# Patient Record
Sex: FEMALE | Race: WHITE
[De-identification: names, ages, dates, MRNs, and addresses within clinical notes are randomized per-mention and may not be internally consistent; named-entity substitution may affect disease eponyms.]

---

## 2019-10-08 ENCOUNTER — HOSPITAL ENCOUNTER (EMERGENCY)
Dept: HOSPITAL 7 - FB.ED | Age: 39
Discharge: HOME | End: 2019-10-08
Payer: SELF-PAY

## 2019-10-08 DIAGNOSIS — H83.09: Primary | ICD-10-CM

## 2019-10-08 DIAGNOSIS — E86.0: ICD-10-CM

## 2019-10-08 DIAGNOSIS — Z91.040: ICD-10-CM

## 2019-10-08 DIAGNOSIS — G43.909: ICD-10-CM

## 2019-10-08 PROCEDURE — 96375 TX/PRO/DX INJ NEW DRUG ADDON: CPT

## 2019-10-08 PROCEDURE — 96374 THER/PROPH/DIAG INJ IV PUSH: CPT

## 2019-10-08 PROCEDURE — 80053 COMPREHEN METABOLIC PANEL: CPT

## 2019-10-08 PROCEDURE — 86140 C-REACTIVE PROTEIN: CPT

## 2019-10-08 PROCEDURE — 85025 COMPLETE CBC W/AUTO DIFF WBC: CPT

## 2019-10-08 PROCEDURE — 36415 COLL VENOUS BLD VENIPUNCTURE: CPT

## 2019-10-08 PROCEDURE — 81001 URINALYSIS AUTO W/SCOPE: CPT

## 2019-10-08 PROCEDURE — 99284 EMERGENCY DEPT VISIT MOD MDM: CPT

## 2019-10-08 PROCEDURE — 96361 HYDRATE IV INFUSION ADD-ON: CPT

## 2019-10-08 NOTE — EDM.PDOC
ED HPI GENERAL MEDICAL PROBLEM





- General


Time Seen by Provider: 10/08/19 18:45


Source of Information: Reports: Patient


History Limitations: Reports: No Limitations





- History of Present Illness


INITIAL COMMENTS - FREE TEXT/NARRATIVE: 





c/o dizzy





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 

son





today is son's bday, pt making a cake at 2:30p and became dizzy, room began to 

spin, felt better when she laid down but only with her eyes closed





has a R sided HA which is typical for her migraines, several months since her 

last migraine, took Advil Migraine x 2 which did not help





did not take meds for her dizziness which she has had several times in the past





has numbness of her face and right hand





her friend is a nurse and come over to help, after 2h friend brought her to the 

hospital once they were able to get someone to watch her son





says she is feeling double





has some N, no V





no f/c/d





possible L sided weakness per nursing, not reproducible however


  ** R sided headache


Pain Score (Numeric/FACES): 9





- Related Data


 Allergies











Allergy/AdvReac Type Severity Reaction Status Date / Time


 


latex Allergy  Rash Verified 10/08/19 18:52











Home Meds: 


 Home Meds





Budesonide/Formoterol [Symbicort 160-4.5 MCG] 2 puff BID 10/08/19 [History]


Levothyroxine 200 mcg PO ACBREAKFAST 10/08/19 [History]


Meclizine HCl 25 mg PO TID PRN #21 tablet 10/08/19 [Rx]


traZODone HCl [Trazodone HCl] 50 mg PO BEDTIME 10/08/19 [History]











ED ROS GENERAL





- Review of Systems


Review Of Systems: See Below


Constitutional: Reports: Weakness


HEENT: Reports: No Symptoms


Respiratory: Reports: No Symptoms


Cardiovascular: Reports: No Symptoms


Endocrine: Reports: No Symptoms


GI/Abdominal: Reports: Nausea


: Reports: No Symptoms


Musculoskeletal: Reports: No Symptoms


Skin: Reports: No Symptoms


Neurological: Reports: Dizziness, Headache, Numbness


Psychiatric: Reports: Anxiety


Hematologic/Lymphatic: Reports: No Symptoms


Immunologic: Reports: No Symptoms





ED EXAM, DIZZINESS





- Physical Exam


Exam: See Below


Exam Limited By: No Limitations


General Appearance: Alert, WD/WN, Other (pleasant, normal speech, holding 

emesis bag, cooperative, inc'd BMI)


Eye Exam: Bilateral Eye: EOMI, PERRL


Ears: Normal External Exam, Normal Canal, Hearing Grossly Normal


Nose: Normal Inspection, Normal Mucosa, No Blood


Throat/Mouth: Normal Inspection, Normal Lips, Normal Teeth, Normal Gums, Normal 

Oropharynx, Normal Voice, No Airway Compromise


Head Exam: Atraumatic, Normocephalic


Neck: Normal Inspection, Supple, Non-Tender, Full Range of Motion.  No: 

Lymphadenopathy (R), Lymphadenopathy (L)


Respiratory/Chest: No Respiratory Distress, Lungs Clear, Normal Breath Sounds, 

No Accessory Muscle Use, Chest Non-Tender


Cardiovascular: Regular Rate, Rhythm, No Edema, No Gallop, No JVD, No Murmur, 

No Rub


GI/Abdominal: Soft, Non-Tender, No Distention


Neurological: Alert, Oriented x 3, Other (ankle flex/ext 5/5 b/l, hand  

wnl b/l, face symmetric, old thyroidectomy scar, nonspecific tender to light 

touch of skin of neck b/l, becomes anxioius)


Back Exam: Normal Inspection


Extremities: Normal Inspection, No Pedal Edema


Psychiatric: Anxious


Skin Exam: Warm, Dry, Intact, Normal Color, No Rash





Course





- Vital Signs


Last Recorded V/S: 


 Last Vital Signs











Temp  36.6 C   10/08/19 18:38


 


Pulse  60   10/08/19 18:38


 


Resp  18   10/08/19 18:38


 


BP  111/56 L  10/08/19 18:38


 


Pulse Ox  100   10/08/19 18:38














- Orders/Labs/Meds


Orders: 


 Active Orders 24 hr











 Category Date Time Status


 


 URINALYSIS W/MICROSCOPIC [UA W/MICROSCOPIC] [URIN] Stat Lab  10/08/19 19:06 

Ordered


 


 Sodium Chloride 0.9% [Normal Saline] 1,000 ml Med  10/08/19 19:15 Active





 IV ASDIRECTED   








 Medication Orders





Sodium Chloride (Normal Saline)  1,000 mls @ 999 mls/hr IV ASDIRECTED ROMI


   Last Admin: 10/08/19 20:20  Dose: 999 mls/hr








Labs: 


 Laboratory Tests











  10/08/19 10/08/19 10/08/19 Range/Units





  18:53 18:53 18:53 


 


WBC  6.5    (4.5-12.0)  X10-3/uL


 


RBC  4.08    (3.23-5.20)  x10(6)uL


 


Hgb  11.2 L    (11.5-15.5)  g/dL


 


Hct  33.4    (30.0-51.3)  %


 


MCV  81.7    (80-96)  fL


 


MCH  27.3 L    (27.7-33.6)  pg


 


MCHC  33.4    (32.2-35.4)  g/dL


 


RDW  13.6    (11.5-15.5)  %


 


Plt Count  306    (125-369)  X10(3)uL


 


MPV  8.3    (7.4-10.4)  fL


 


Neut % (Auto)  68.6    (46-82)  %


 


Lymph % (Auto)  22.0    (13-37)  %


 


Mono % (Auto)  7.9    (4-12)  %


 


Eos % (Auto)  1    (1.0-5.0)  %


 


Baso % (Auto)  0    (0-2)  %


 


Neut # (Auto)  4.5    (1.6-8.3)  #


 


Lymph # (Auto)  1.4    (0.6-5.0)  #


 


Mono # (Auto)  0.5    (0.0-1.3)  #


 


Eos # (Auto)  0.1    (0.0-0.8)  #


 


Baso # (Auto)  0.0    (0.0-0.2)  #


 


Sodium   143   (135-145)  mmol/L


 


Potassium   3.5   (3.5-5.3)  mmol/L


 


Chloride   107   (100-110)  mmol/L


 


Carbon Dioxide   27   (21-32)  mmol/L


 


BUN   14   (7-18)  mg/dL


 


Creatinine   0.8   (0.55-1.02)  mg/dL


 


Est Cr Clr Drug Dosing   TNP   


 


Estimated GFR (MDRD)   > 60   (>60)  


 


BUN/Creatinine Ratio   17.5   (9-20)  


 


Glucose   98   ()  mg/dL


 


Calcium   8.3 L   (8.6-10.2)  mg/dL


 


Total Bilirubin   0.2   (0.1-1.3)  mg/dL


 


AST   12   (5-25)  IU/L


 


ALT   21   (12-36)  U/L


 


Alkaline Phosphatase   58   ()  IU/L


 


C-Reactive Protein    1.2 H  (0.5-0.9)  mg/dL


 


Total Protein   6.5   (6.0-8.0)  g/dL


 


Albumin   3.4 L   (3.5-5.2)  g/dL


 


Globulin   3.1   g/dL


 


Albumin/Globulin Ratio   1.1   











Meds: 


Medications











Generic Name Dose Route Start Last Admin





  Trade Name Amisha  PRN Reason Stop Dose Admin


 


Sodium Chloride  1,000 mls @ 999 mls/hr  10/08/19 19:15  10/08/19 20:20





  Normal Saline  IV   999 mls/hr





  ASDIRECTED ROMI   Administration





     





     





     





     














Discontinued Medications














Generic Name Dose Route Start Last Admin





  Trade Name Amisha  PRN Reason Stop Dose Admin


 


Ketorolac Tromethamine  30 mg  10/08/19 19:10  10/08/19 20:29





  Toradol  IVPUSH  10/08/19 19:11  30 mg





  ONETIME ONE   Administration





     





     





     





     


 


Metoclopramide HCl  10 mg  10/08/19 19:10  10/08/19 20:26





  Reglan  IVPUSH  10/08/19 19:11  10 mg





  ONETIME ONE   Administration





     





     





     





     


 


Promethazine HCl  12.5 mg  10/08/19 19:09  10/08/19 20:40





  Phenergan  PO  10/08/19 19:10  Not Given





  NOW ONE   





     





     





     





     


 


Promethazine HCl  Confirm  10/08/19 20:34  





  Phenergan  Administered  10/08/19 20:35  





  Dose   





  25 mg   





  .ROUTE   





  .STK-MED ONE   





     





     





     





     


 


Promethazine HCl  12.5 mg  10/08/19 20:39  10/08/19 20:40





  Phenergan  PO  10/08/19 20:40  12.5 mg





  ONETIME ONE   Administration





     





     





     





     














- Re-Assessments/Exams


Free Text/Narrative Re-Assessment/Exam: 





10/08/19 21:35


pt sitting up on edge of bed, smiling, cheerful, N gone, dizzy gone, HA gone





labs neg except CRP 1.2, c/w viral labyrinthitis








Departure





- Departure


Time of Disposition: 21:35


Disposition: Home, Self-Care 01


Condition: Good


Clinical Impression: 


 Acute viral labyrinthitis, Dehydration, Migraine








- Discharge Information


*PRESCRIPTION DRUG MONITORING PROGRAM REVIEWED*: Not Applicable


*COPY OF PRESCRIPTION DRUG MONITORING REPORT IN PATIENT MO: Not Applicable


Prescriptions: 


Meclizine HCl 25 mg PO TID PRN #21 tablet


 PRN Reason: Dizziness


Instructions:  Labyrinthitis


Additional Instructions: 


To break the pain cycle, continue ibuprofen 200 mg 3 tabs and acetaminophen 500 

mg 2 tabs with meals and bedtime for 4 more doses.





For dizziness, take meclizine 25 mg 1 tab 3 times a day for 2 days, then every 

8 hours as needed.





Increase fluids.





Get adequate rest.





See your doctor or return to ED if you start feeling worse.





- My Orders


Last 24 Hours: 


My Active Orders





10/08/19 19:06


URINALYSIS W/MICROSCOPIC [UA W/MICROSCOPIC] [URIN] Stat 





10/08/19 19:15


Sodium Chloride 0.9% [Normal Saline] 1,000 ml IV ASDIRECTED 














- Assessment/Plan


Last 24 Hours: 


My Active Orders





10/08/19 19:06


URINALYSIS W/MICROSCOPIC [UA W/MICROSCOPIC] [URIN] Stat 





10/08/19 19:15


Sodium Chloride 0.9% [Normal Saline] 1,000 ml IV ASDIRECTED

## 2019-10-09 VITALS — SYSTOLIC BLOOD PRESSURE: 102 MMHG | DIASTOLIC BLOOD PRESSURE: 49 MMHG | HEART RATE: 59 BPM
